# Patient Record
Sex: MALE | URBAN - METROPOLITAN AREA
[De-identification: names, ages, dates, MRNs, and addresses within clinical notes are randomized per-mention and may not be internally consistent; named-entity substitution may affect disease eponyms.]

---

## 2022-07-26 ENCOUNTER — OFFICE (OUTPATIENT)
Dept: URBAN - METROPOLITAN AREA CLINIC 11 | Facility: CLINIC | Age: 27
End: 2022-07-26

## 2022-07-26 VITALS
HEIGHT: 70 IN | OXYGEN SATURATION: 99 % | HEART RATE: 82 BPM | DIASTOLIC BLOOD PRESSURE: 72 MMHG | SYSTOLIC BLOOD PRESSURE: 144 MMHG | WEIGHT: 181 LBS

## 2022-07-26 DIAGNOSIS — K62.89 OTHER SPECIFIED DISEASES OF ANUS AND RECTUM: ICD-10-CM

## 2022-07-26 PROCEDURE — 99203 OFFICE O/P NEW LOW 30 MIN: CPT | Performed by: INTERNAL MEDICINE

## 2022-07-26 RX ORDER — DOCUSATE SODIUM 100 MG/1
CAPSULE ORAL
Qty: 90 | Refills: 0 | Status: ACTIVE
Start: 2022-07-26

## 2022-07-26 RX ORDER — HYDROCORTISONE ACETATE PRAMOXINE HCL 2.5; 1 G/100G; G/100G
CREAM TOPICAL
Qty: 30 | Refills: 1 | Status: ACTIVE
Start: 2022-07-26

## 2022-07-26 NOTE — SERVICENOTES
We discussed what was likely a fissure after his diarrhea/hard stools.  We discussed a higher fiber diet, topical meds, sitz baths, and risks of recurrent issues.  I would also stop the antibiotics given the lack of infectious sx/sx.